# Patient Record
Sex: MALE | Race: WHITE | NOT HISPANIC OR LATINO | Employment: UNEMPLOYED | URBAN - METROPOLITAN AREA
[De-identification: names, ages, dates, MRNs, and addresses within clinical notes are randomized per-mention and may not be internally consistent; named-entity substitution may affect disease eponyms.]

---

## 2023-12-22 ENCOUNTER — HOSPITAL ENCOUNTER (EMERGENCY)
Facility: HOSPITAL | Age: 43
Discharge: HOME | End: 2023-12-22
Attending: STUDENT IN AN ORGANIZED HEALTH CARE EDUCATION/TRAINING PROGRAM

## 2023-12-22 ENCOUNTER — HOSPITAL ENCOUNTER (EMERGENCY)
Facility: HOSPITAL | Age: 43
Discharge: HOME | End: 2023-12-23
Attending: STUDENT IN AN ORGANIZED HEALTH CARE EDUCATION/TRAINING PROGRAM

## 2023-12-22 ENCOUNTER — HOSPITAL ENCOUNTER (EMERGENCY)
Facility: HOSPITAL | Age: 43
Discharge: STILL ADMITTED OR ADMINISTRATIVE | End: 2024-02-08

## 2023-12-22 ENCOUNTER — APPOINTMENT (OUTPATIENT)
Dept: CARDIOLOGY | Facility: HOSPITAL | Age: 43
End: 2023-12-22

## 2023-12-22 VITALS
HEART RATE: 88 BPM | TEMPERATURE: 98.3 F | HEIGHT: 72 IN | RESPIRATION RATE: 17 BRPM | BODY MASS INDEX: 24.38 KG/M2 | WEIGHT: 180 LBS | DIASTOLIC BLOOD PRESSURE: 79 MMHG | SYSTOLIC BLOOD PRESSURE: 132 MMHG | OXYGEN SATURATION: 98 %

## 2023-12-22 DIAGNOSIS — F10.10 ALCOHOL ABUSE: Primary | ICD-10-CM

## 2023-12-22 DIAGNOSIS — F10.920 ALCOHOLIC INTOXICATION WITHOUT COMPLICATION (CMS-HCC): ICD-10-CM

## 2023-12-22 DIAGNOSIS — F10.920 ALCOHOLIC INTOXICATION WITHOUT COMPLICATION (CMS-HCC): Primary | ICD-10-CM

## 2023-12-22 DIAGNOSIS — Z20.2 STD EXPOSURE: ICD-10-CM

## 2023-12-22 DIAGNOSIS — Z59.819 HOUSING INSTABILITY: ICD-10-CM

## 2023-12-22 LAB
ALBUMIN SERPL-MCNC: 4.3 G/DL (ref 3.5–5)
ALP BLD-CCNC: 76 U/L (ref 35–125)
ALT SERPL-CCNC: 60 U/L (ref 5–40)
AMPHETAMINES UR QL SCN>1000 NG/ML: NEGATIVE
ANION GAP SERPL CALC-SCNC: 16 MMOL/L
APAP SERPL-MCNC: <5 UG/ML
AST SERPL-CCNC: 158 U/L (ref 5–40)
ATRIAL RATE: 92 BPM
BARBITURATES UR QL SCN>300 NG/ML: NEGATIVE
BASOPHILS # BLD AUTO: 0.03 X10*3/UL (ref 0–0.1)
BASOPHILS NFR BLD AUTO: 0.7 %
BENZODIAZ UR QL SCN>300 NG/ML: NEGATIVE
BILIRUB SERPL-MCNC: 1.3 MG/DL (ref 0.1–1.2)
BUN SERPL-MCNC: 7 MG/DL (ref 8–25)
BZE UR QL SCN>300 NG/ML: NEGATIVE
CALCIUM SERPL-MCNC: 9.2 MG/DL (ref 8.5–10.4)
CANNABINOIDS UR QL SCN>50 NG/ML: NEGATIVE
CHLORIDE SERPL-SCNC: 100 MMOL/L (ref 97–107)
CO2 SERPL-SCNC: 25 MMOL/L (ref 24–31)
CREAT SERPL-MCNC: 0.6 MG/DL (ref 0.4–1.6)
EOSINOPHIL # BLD AUTO: 0.05 X10*3/UL (ref 0–0.7)
EOSINOPHIL NFR BLD AUTO: 1.1 %
ERYTHROCYTE [DISTWIDTH] IN BLOOD BY AUTOMATED COUNT: 12.8 % (ref 11.5–14.5)
ETHANOL SERPL-MCNC: 0.37 G/DL
FENTANYL+NORFENTANYL UR QL SCN: NEGATIVE
FLUAV RNA RESP QL NAA+PROBE: NOT DETECTED
FLUBV RNA RESP QL NAA+PROBE: NOT DETECTED
GFR SERPL CREATININE-BSD FRML MDRD: >90 ML/MIN/1.73M*2
GLUCOSE SERPL-MCNC: 114 MG/DL (ref 65–99)
HCT VFR BLD AUTO: 45.8 % (ref 41–52)
HGB BLD-MCNC: 15.8 G/DL (ref 13.5–17.5)
IMM GRANULOCYTES # BLD AUTO: 0.01 X10*3/UL (ref 0–0.7)
IMM GRANULOCYTES NFR BLD AUTO: 0.2 % (ref 0–0.9)
LYMPHOCYTES # BLD AUTO: 1.08 X10*3/UL (ref 1.2–4.8)
LYMPHOCYTES NFR BLD AUTO: 24.8 %
MCH RBC QN AUTO: 34.3 PG (ref 26–34)
MCHC RBC AUTO-ENTMCNC: 34.5 G/DL (ref 32–36)
MCV RBC AUTO: 100 FL (ref 80–100)
METHADONE UR QL SCN>300 NG/ML: NEGATIVE
MONOCYTES # BLD AUTO: 0.7 X10*3/UL (ref 0.1–1)
MONOCYTES NFR BLD AUTO: 16.1 %
NEUTROPHILS # BLD AUTO: 2.49 X10*3/UL (ref 1.2–7.7)
NEUTROPHILS NFR BLD AUTO: 57.1 %
NRBC BLD-RTO: 0 /100 WBCS (ref 0–0)
OPIATES UR QL SCN>300 NG/ML: NEGATIVE
OXYCODONE UR QL: NEGATIVE
P AXIS: 119 DEGREES
P OFFSET: 193 MS
P ONSET: 138 MS
PCP UR QL SCN>25 NG/ML: NEGATIVE
PLATELET # BLD AUTO: 150 X10*3/UL (ref 150–450)
POTASSIUM SERPL-SCNC: 3.7 MMOL/L (ref 3.4–5.1)
PR INTERVAL: 162 MS
PROT SERPL-MCNC: 8.2 G/DL (ref 5.9–7.9)
Q ONSET: 219 MS
QRS COUNT: 16 BEATS
QRS DURATION: 100 MS
QT INTERVAL: 380 MS
QTC CALCULATION(BAZETT): 469 MS
QTC FREDERICIA: 438 MS
R AXIS: 99 DEGREES
RBC # BLD AUTO: 4.6 X10*6/UL (ref 4.5–5.9)
SALICYLATES SERPL-MCNC: <0 MG/DL
SARS-COV-2 RNA RESP QL NAA+PROBE: NOT DETECTED
SODIUM SERPL-SCNC: 141 MMOL/L (ref 133–145)
T AXIS: 116 DEGREES
T OFFSET: 409 MS
VENTRICULAR RATE: 92 BPM
WBC # BLD AUTO: 4.4 X10*3/UL (ref 4.4–11.3)

## 2023-12-22 PROCEDURE — 87636 SARSCOV2 & INF A&B AMP PRB: CPT | Performed by: STUDENT IN AN ORGANIZED HEALTH CARE EDUCATION/TRAINING PROGRAM

## 2023-12-22 PROCEDURE — 87800 DETECT AGNT MULT DNA DIREC: CPT | Mod: WESLAB | Performed by: PHYSICIAN ASSISTANT

## 2023-12-22 PROCEDURE — 80179 DRUG ASSAY SALICYLATE: CPT | Performed by: STUDENT IN AN ORGANIZED HEALTH CARE EDUCATION/TRAINING PROGRAM

## 2023-12-22 PROCEDURE — 93005 ELECTROCARDIOGRAM TRACING: CPT

## 2023-12-22 PROCEDURE — 2500000004 HC RX 250 GENERAL PHARMACY W/ HCPCS (ALT 636 FOR OP/ED): Performed by: PHYSICIAN ASSISTANT

## 2023-12-22 PROCEDURE — 85025 COMPLETE CBC W/AUTO DIFF WBC: CPT | Performed by: STUDENT IN AN ORGANIZED HEALTH CARE EDUCATION/TRAINING PROGRAM

## 2023-12-22 PROCEDURE — 2500000001 HC RX 250 WO HCPCS SELF ADMINISTERED DRUGS (ALT 637 FOR MEDICARE OP): Performed by: PHYSICIAN ASSISTANT

## 2023-12-22 PROCEDURE — 4500999001 HC ED NO CHARGE

## 2023-12-22 PROCEDURE — 99284 EMERGENCY DEPT VISIT MOD MDM: CPT | Performed by: STUDENT IN AN ORGANIZED HEALTH CARE EDUCATION/TRAINING PROGRAM

## 2023-12-22 PROCEDURE — 99284 EMERGENCY DEPT VISIT MOD MDM: CPT | Mod: 25

## 2023-12-22 PROCEDURE — 80053 COMPREHEN METABOLIC PANEL: CPT | Performed by: STUDENT IN AN ORGANIZED HEALTH CARE EDUCATION/TRAINING PROGRAM

## 2023-12-22 PROCEDURE — 80307 DRUG TEST PRSMV CHEM ANLYZR: CPT | Performed by: STUDENT IN AN ORGANIZED HEALTH CARE EDUCATION/TRAINING PROGRAM

## 2023-12-22 PROCEDURE — 99281 EMR DPT VST MAYX REQ PHY/QHP: CPT

## 2023-12-22 PROCEDURE — 99283 EMERGENCY DEPT VISIT LOW MDM: CPT | Performed by: STUDENT IN AN ORGANIZED HEALTH CARE EDUCATION/TRAINING PROGRAM

## 2023-12-22 PROCEDURE — 96372 THER/PROPH/DIAG INJ SC/IM: CPT

## 2023-12-22 PROCEDURE — 36415 COLL VENOUS BLD VENIPUNCTURE: CPT | Performed by: STUDENT IN AN ORGANIZED HEALTH CARE EDUCATION/TRAINING PROGRAM

## 2023-12-22 RX ORDER — CEFTRIAXONE 500 MG/1
500 INJECTION, POWDER, FOR SOLUTION INTRAMUSCULAR; INTRAVENOUS ONCE
Status: COMPLETED | OUTPATIENT
Start: 2023-12-22 | End: 2023-12-22

## 2023-12-22 RX ORDER — LANOLIN ALCOHOL/MO/W.PET/CERES
100 CREAM (GRAM) TOPICAL DAILY
Status: DISCONTINUED | OUTPATIENT
Start: 2023-12-22 | End: 2023-12-22 | Stop reason: HOSPADM

## 2023-12-22 RX ORDER — LORAZEPAM 0.5 MG/1
0.5 TABLET ORAL EVERY 2 HOUR PRN
Status: DISCONTINUED | OUTPATIENT
Start: 2023-12-22 | End: 2023-12-22 | Stop reason: HOSPADM

## 2023-12-22 RX ORDER — FOLIC ACID 1 MG/1
1 TABLET ORAL DAILY
Status: DISCONTINUED | OUTPATIENT
Start: 2023-12-22 | End: 2023-12-22 | Stop reason: HOSPADM

## 2023-12-22 RX ORDER — MULTIVIT-MIN/IRON FUM/FOLIC AC 7.5 MG-4
1 TABLET ORAL DAILY
Status: DISCONTINUED | OUTPATIENT
Start: 2023-12-22 | End: 2023-12-22 | Stop reason: HOSPADM

## 2023-12-22 RX ORDER — LORAZEPAM 1 MG/1
2 TABLET ORAL EVERY 2 HOUR PRN
Status: DISCONTINUED | OUTPATIENT
Start: 2023-12-22 | End: 2023-12-22 | Stop reason: HOSPADM

## 2023-12-22 RX ORDER — LORAZEPAM 1 MG/1
1 TABLET ORAL EVERY 2 HOUR PRN
Status: DISCONTINUED | OUTPATIENT
Start: 2023-12-22 | End: 2023-12-22 | Stop reason: HOSPADM

## 2023-12-22 RX ORDER — AZITHROMYCIN 500 MG/1
1000 TABLET, FILM COATED ORAL ONCE
Status: COMPLETED | OUTPATIENT
Start: 2023-12-22 | End: 2023-12-22

## 2023-12-22 RX ADMIN — FOLIC ACID 1 MG: 1 TABLET ORAL at 12:20

## 2023-12-22 RX ADMIN — Medication 1 TABLET: at 12:20

## 2023-12-22 RX ADMIN — Medication 100 MG: at 12:20

## 2023-12-22 RX ADMIN — AZITHROMYCIN DIHYDRATE 1000 MG: 500 TABLET ORAL at 12:50

## 2023-12-22 RX ADMIN — CEFTRIAXONE SODIUM 500 MG: 500 INJECTION, POWDER, FOR SOLUTION INTRAMUSCULAR; INTRAVENOUS at 12:50

## 2023-12-22 SDOH — HEALTH STABILITY: MENTAL HEALTH: IN THE PAST FEW WEEKS, HAVE YOU FELT THAT YOU OR YOUR FAMILY WOULD BE BETTER OFF IF YOU WERE DEAD?: NO

## 2023-12-22 SDOH — HEALTH STABILITY: MENTAL HEALTH: SUICIDAL BEHAVIOR (LIFETIME): NO

## 2023-12-22 SDOH — HEALTH STABILITY: MENTAL HEALTH: HAVE YOU ACTUALLY HAD ANY THOUGHTS OF KILLING YOURSELF?: NO

## 2023-12-22 SDOH — HEALTH STABILITY: MENTAL HEALTH: SUICIDE ASSESSMENT: ADULT (C-SSRS)

## 2023-12-22 SDOH — HEALTH STABILITY: MENTAL HEALTH: IN THE PAST FEW WEEKS, HAVE YOU WISHED YOU WERE DEAD?: NO

## 2023-12-22 SDOH — HEALTH STABILITY: MENTAL HEALTH: HAVE YOU EVER TRIED TO KILL YOURSELF?: NO

## 2023-12-22 SDOH — HEALTH STABILITY: MENTAL HEALTH: ARE YOU HAVING THOUGHTS OF KILLING YOURSELF RIGHT NOW?: NO

## 2023-12-22 SDOH — HEALTH STABILITY: MENTAL HEALTH: HAVE YOU WISHED YOU WERE DEAD OR WISHED YOU COULD GO TO SLEEP AND NOT WAKE UP?: NO

## 2023-12-22 SDOH — HEALTH STABILITY: MENTAL HEALTH: IN THE PAST WEEK, HAVE YOU BEEN HAVING THOUGHTS ABOUT KILLING YOURSELF?: NO

## 2023-12-22 SDOH — HEALTH STABILITY: MENTAL HEALTH: ANXIETY SYMPTOMS: NO PROBLEMS REPORTED OR OBSERVED.

## 2023-12-22 SDOH — HEALTH STABILITY: MENTAL HEALTH: DEPRESSION SYMPTOMS: NO PROBLEMS REPORTED OR OBSERVED.

## 2023-12-22 SDOH — ECONOMIC STABILITY - HOUSING INSECURITY: HOUSING INSTABILITY UNSPECIFIED: Z59.819

## 2023-12-22 SDOH — HEALTH STABILITY: MENTAL HEALTH: NON-SPECIFIC ACTIVE SUICIDAL THOUGHTS (PAST 1 MONTH): NO

## 2023-12-22 SDOH — HEALTH STABILITY: MENTAL HEALTH: WISH TO BE DEAD (PAST 1 MONTH): NO

## 2023-12-22 SDOH — ECONOMIC STABILITY: HOUSING INSECURITY: FEELS SAFE LIVING IN HOME: YES

## 2023-12-22 SDOH — HEALTH STABILITY: MENTAL HEALTH: HAVE YOU EVER DONE ANYTHING, STARTED TO DO ANYTHING, OR PREPARED TO DO ANYTHING TO END YOUR LIFE?: NO

## 2023-12-22 ASSESSMENT — LIFESTYLE VARIABLES
TOTAL SCORE: 1
HAVE YOU EVER FELT YOU SHOULD CUT DOWN ON YOUR DRINKING: NO
AUDITORY DISTURBANCES: NOT PRESENT
PAROXYSMAL SWEATS: NO SWEAT VISIBLE
ORIENTATION AND CLOUDING OF SENSORIUM: ORIENTED AND CAN DO SERIAL ADDITIONS
HEADACHE, FULLNESS IN HEAD: NOT PRESENT
HEADACHE, FULLNESS IN HEAD: NOT PRESENT
NAUSEA AND VOMITING: NO NAUSEA AND NO VOMITING
EVER HAD A DRINK FIRST THING IN THE MORNING TO STEADY YOUR NERVES TO GET RID OF A HANGOVER: NO
ANXIETY: MILDLY ANXIOUS
HAVE PEOPLE ANNOYED YOU BY CRITICIZING YOUR DRINKING: NO
EVER FELT BAD OR GUILTY ABOUT YOUR DRINKING: NO
AGITATION: NORMAL ACTIVITY
VISUAL DISTURBANCES: NOT PRESENT
ORIENTATION AND CLOUDING OF SENSORIUM: ORIENTED AND CAN DO SERIAL ADDITIONS
PAROXYSMAL SWEATS: NO SWEAT VISIBLE
TOTAL SCORE: 0
VISUAL DISTURBANCES: NOT PRESENT
HAVE PEOPLE ANNOYED YOU BY CRITICIZING YOUR DRINKING: NO
SUBSTANCE_ABUSE_PAST_12_MONTHS: NO
TREMOR: NO TREMOR
ANXIETY: NO ANXIETY, AT EASE
AUDITORY DISTURBANCES: NOT PRESENT
REASON UNABLE TO ASSESS: NO
EVER HAD A DRINK FIRST THING IN THE MORNING TO STEADY YOUR NERVES TO GET RID OF A HANGOVER: NO
NAUSEA AND VOMITING: NO NAUSEA AND NO VOMITING
HAVE YOU EVER FELT YOU SHOULD CUT DOWN ON YOUR DRINKING: NO
HEADACHE, FULLNESS IN HEAD: NOT PRESENT
VISUAL DISTURBANCES: NOT PRESENT
PRESCIPTION_ABUSE_PAST_12_MONTHS: NO
TREMOR: NO TREMOR
ORIENTATION AND CLOUDING OF SENSORIUM: ORIENTED AND CAN DO SERIAL ADDITIONS
AGITATION: NORMAL ACTIVITY
ANXIETY: NO ANXIETY, AT EASE
AUDITORY DISTURBANCES: NOT PRESENT
PAROXYSMAL SWEATS: NO SWEAT VISIBLE
AGITATION: NORMAL ACTIVITY
EVER FELT BAD OR GUILTY ABOUT YOUR DRINKING: NO
TREMOR: NO TREMOR
NAUSEA AND VOMITING: NO NAUSEA AND NO VOMITING
TOTAL SCORE: 0
REASON UNABLE TO ASSESS: NO

## 2023-12-22 ASSESSMENT — ABNORMAL INVOLUNTARY MOVEMENT SCALE (AIMS)
NECK_SHOULDER_HIPS: NONE, NORMAL
CURRENT_PROBLEMS_TEETH_DENTURES: NO
LIPS_PARIETAL: NONE, NORMAL
AIMS_PATIENT_AWARENESS: NO AWARENESS
UPPER_BODY_EXTREMITIES: NONE, NORMAL
PATIENT_WEARS_DENTURES: NO
LOWER_BODY_EXTREMITIES: NONE, NORMAL
FACIAL_EXPRESSION_MUSCLES: NONE, NORMAL
AIMS_PATIENT_INCAPACITATION: NONE, NORMAL
TONGUE: NONE, NORMAL
JAW: NONE, NORMAL

## 2023-12-22 ASSESSMENT — COLUMBIA-SUICIDE SEVERITY RATING SCALE - C-SSRS
2. HAVE YOU ACTUALLY HAD ANY THOUGHTS OF KILLING YOURSELF?: NO
1. IN THE PAST MONTH, HAVE YOU WISHED YOU WERE DEAD OR WISHED YOU COULD GO TO SLEEP AND NOT WAKE UP?: NO
6. HAVE YOU EVER DONE ANYTHING, STARTED TO DO ANYTHING, OR PREPARED TO DO ANYTHING TO END YOUR LIFE?: NO

## 2023-12-22 ASSESSMENT — PAIN SCALES - GENERAL
PAINLEVEL_OUTOF10: 0 - NO PAIN
PAINLEVEL_OUTOF10: 0 - NO PAIN

## 2023-12-22 ASSESSMENT — PAIN DESCRIPTION - PROGRESSION: CLINICAL_PROGRESSION: NOT CHANGED

## 2023-12-22 ASSESSMENT — PAIN - FUNCTIONAL ASSESSMENT
PAIN_FUNCTIONAL_ASSESSMENT: 0-10
PAIN_FUNCTIONAL_ASSESSMENT: 0-10

## 2023-12-22 NOTE — PROGRESS NOTES
"EPAT - Social Work Psychiatric Assessment    Arrival Details  Mode of Arrival: Ambulatory  Admission Source: Home  Admission Type: Voluntary  EPAT Assessment Start Date: 12/22/23  EPAT Assessment Start Time: 1240  Name of : Sofy Medina LPC    History of Present Illness  Admission Reason: alcohol detox  HPI: Pt is a 44y/o male presenting to [facility] for alcohol detox. Pt was sent from [facility] for medical clearance and referral to [facility]. Pt chart, triage and provider notes reviewed prior to consult. Nursing notes report pt as \"no risk indicated\". Pt BAL .398 on arrival. Pt reports drinking an unknown amount of vodka nearly every day. Pt unable to provide direct timelines of use history but shares he first started drinking in high school. Pt reports that he is new to [facility] and has moved in w/ sober supports. Pt denies any previous attempts at detoxing or treatment but is active w/ AA and has a sponsor. Pt denies SI/HI/AH/VH. Pt denies current withdrawal symptoms.    SW Readmission Information   Readmission within 30 Days: No    Psychiatric Symptoms  Anxiety Symptoms: No problems reported or observed.  Depression Symptoms: No problems reported or observed.  Janina Symptoms: No problems reported or observed.    Psychosis Symptoms  Hallucination Type: No problems reported or observed.  Delusion Type: No problems reported or observed.    Additional Symptoms - Adult  Generalized Anxiety Disorder: No problems reported or observed.  Obsessive Compulsive Disorder: No problems reported or observed.  Panic Attack: No problems reported or observed.  Post Traumatic Stress Disorder: No problems reported or observed.  Delirium: No problems reported or observed.    Past Psychiatric History/Meds/Treatments  Past Psychiatric History: alcohol abuse, denies other MH history  Past Psychiatric Meds/Treatments: Denies  Past Violence/Victimization History: Denies    Current Mental Health Contacts   Name/Phone Number: " "Denies    Support System: Immediate family, Extended family, Other (Comment) (AA sponsor)    Living Arrangement: Lives with someone (Pt reports that he lives w/ a friend.)    Home Safety  Feels Safe Living in Home: Yes    Income Information  Employment Status for: Patient  Employment Status: Employed              Legal  Legal Considerations: Patient/ Family Ability to Make Healthcare Decisions  Legal Concerns: Pt reports he has a \"situation\" he's working through but denies being on probation.    Drug Screening  Have you used any substances (canabis, cocaine, heroin, hallucinogens, inhalants, etc.) in the past 12 months?: No  Have you used any prescription drugs other than prescribed in the past 12 months?: No  Is a toxicology screen needed?: Yes    Stage of Change  Stage of Change: Action  History of Treatment:  (Denies)  Type of Treatment Offered: Inpatient  Treatment Offered: Accepted  Frequency of Substance Use: nearly every day  Age of First Substance Use: high school    Psychosocial  Psychosocial (WDL): Within Defined Limits    Orientation  Orientation Level: Oriented X4, Appropriate for developmental age    General Appearance  Motor Activity: Unremarkable  General Attitude: Cooperative, Pleasant  Appearance/Hygiene: Unremarkable    Thought Process  Content: Unremarkable  Perception: Not altered  Hallucination: None  Judgment/Insight: Poor  Confusion: None  Cognition: Appropriate safety awareness, Appropriate attention/concentration, Appropriate for developmental age         Risk Factors  Self Harm/Suicidal Ideation Plan: Denies current or previous SI.  Previous Self Harm/Suicidal Plans: Denies  Risk Factors: Substance abuse, Male  Description of Thoughts/Ideas Leaving Unit Now: Denies    Violence Risk Assessment  Assessment of Violence: None noted  Thoughts of Harm to Others: No    Ability to Assess Risk Screen  Risk Screen - Ability to Assess: Unable to assess  Ask Suicide-Screening Questions  1. In the past " few weeks, have you wished you were dead?: No  2. In the past few weeks, have you felt that you or your family would be better off if you were dead?: No  3. In the past week, have you been having thoughts about killing yourself?: No  4. Have you ever tried to kill yourself?: No  5. Are you having thoughts of killing yourself right now?: No  Calculated Risk Score: No intervention is necessary  [place] Suicide Severity Rating Scale (Screener/Recent Self-Report)  1. Wish to be Dead (Past 1 Month): No  2. Non-Specific Active Suicidal Thoughts (Past 1 Month): No  6. Suicidal Behavior (Lifetime): No  Calculated C-SSRS Risk Score (Lifetime/Recent): No Risk Indicated  Step 5: Documentation  Risk Level: Low suicide risk    Psychiatric Impression and Plan of Care  Assessment and Plan: SW met FTF w/ pt for ss consult. Pt calm and cooperative and participates appropriately. Pt BAL .398 but is otherwise medically clear per attending. Pt will be referred to [facility].  Specific Resources Provided to Patient: Peer supportChuck, met w/ pt for additional support.  CM Notified: N/A    Outcome/Disposition  Patient's Perception of Outcome Achieved: in agreement  Assessment, Recommendations and Risk Level Reviewed with: Dr Gamal KHALIL Assessment Completed Date: 12/22/23  EPAT Assessment Completed Time: 1253  Patient Disposition: Out of network facility (Specify) ([facility])  Out of Network Reason: Other (Comment) ([facility]) Pt declined for [facility] due to not being [facility] for at least one year.

## 2023-12-22 NOTE — ED PROVIDER NOTES
HPI   Chief Complaint   Patient presents with   • Alcohol Problem   • Alcohol Detox       43-year-old male presented emergency department the chief complaint of alcohol use disorder.  He drinks alcohol daily.  Typically vodka.  He would like to stop drinking.  His last drink was just prior to arrival.  He is not suicidal or homicidal.  He denies lightheadedness dizziness chest pain shortness of breath abdominal pain dysuria.  He is complaining of penile discharge and requesting STD screening and testing.  Denies injury or trauma.  No other complaint.                          Mary Coma Scale Score: 15                  Patient History   Past Medical History:   Diagnosis Date   • Mental health problem     Denies to state what kind     History reviewed. No pertinent surgical history.  No family history on file.  Social History     Tobacco Use   • Smoking status: Never   • Smokeless tobacco: Never   Substance Use Topics   • Alcohol use: Not on file   • Drug use: Not on file       Physical Exam   ED Triage Vitals [12/22/23 1118]   Temp Heart Rate Resp BP   37.1 °C (98.8 °F) 100 18 (!) 129/103      SpO2 Temp Source Heart Rate Source Patient Position   97 % Temporal Monitor Sitting      BP Location FiO2 (%)     Right arm --       Physical Exam  Vitals and nursing note reviewed.   Constitutional:       Appearance: Normal appearance.   HENT:      Head: Normocephalic and atraumatic.      Nose: Nose normal.      Mouth/Throat:      Mouth: Mucous membranes are moist.      Pharynx: No oropharyngeal exudate.   Cardiovascular:      Rate and Rhythm: Normal rate and regular rhythm.   Pulmonary:      Effort: Pulmonary effort is normal.      Breath sounds: Normal breath sounds.   Abdominal:      General: Abdomen is flat.      Palpations: Abdomen is soft.   Musculoskeletal:         General: Normal range of motion.      Cervical back: Normal range of motion.   Skin:     General: Skin is warm and dry.   Neurological:      General: No  focal deficit present.      Mental Status: He is alert and oriented to person, place, and time.   Psychiatric:         Mood and Affect: Mood normal.         ED Course & MDM   ED Course as of 12/22/23 1712   Fri Dec 22, 2023   1422 EKG presented to me at 2:22 PM     EKG as interpreted by me shows normal sinus rhythm at a rate of 92 bpm, no ST changes to suggest STEMI, normal axis, normal intervals.   [DH]      ED Course User Index  [DH] Asim Yeung MD         Diagnoses as of 12/22/23 1712   Alcohol abuse   Alcoholic intoxication without complication (CMS/HCC)   STD exposure       Medical Decision Making  I have seen and evaluated this patient.  The attending physician has also seen and evaluated this patient.  Vital signs, laboratory testing and diagnostic images if applicable have been reviewed.  All laboratory and imaging is interpreted by myself unless otherwise stated.  Radiology studies are also formally interpreted by radiologist.    CBC without significant leukocytosis or anemia, metabolic panel without significant renal impairment or electrolyte abnormality.  Patient with elevated alcohol level.  He is medically cleared.  He does not like [facility] resident and therefore the [facility] will not pay for his alcohol detox.  He is agreeable to discharge.  Patient will obtain sober ride and be discharged from an emergent standpoint.  Return if worsening or changes symptoms.  No evidence of withdrawal at this point in time.    Labs Reviewed  CBC WITH AUTO DIFFERENTIAL - Abnormal     WBC                           4.4                    nRBC                          0.0                    RBC                           4.60                   Hemoglobin                    15.8                   Hematocrit                    45.8                   MCV                           100                    MCH                           34.3 (*)               MCHC                          34.5                   RDW                            12.8                   Platelets                     150                    Neutrophils %                 57.1                   Immature Granulocytes %, Automated   0.2                    Lymphocytes %                 24.8                   Monocytes %                   16.1                   Eosinophils %                 1.1                    Basophils %                   0.7                    Neutrophils Absolute          2.49                   Immature Granulocytes Absolute, Au*   0.01                   Lymphocytes Absolute          1.08 (*)               Monocytes Absolute            0.70                   Eosinophils Absolute          0.05                   Basophils Absolute            0.03                COMPREHENSIVE METABOLIC PANEL - Abnormal     Glucose                       114 (*)                Sodium                        141                    Potassium                     3.7                    Chloride                      100                    Bicarbonate                   25                     Urea Nitrogen                 7 (*)                  Creatinine                    0.60                   eGFR                          >90                    Calcium                       9.2                    Albumin                       4.3                    Alkaline Phosphatase          76                     Total Protein                 8.2 (*)                AST                           158 (*)                Bilirubin, Total              1.3 (*)                ALT                           60 (*)                 Anion Gap                     16                  ACUTE TOXICOLOGY PANEL, BLOOD - Abnormal     Acetaminophen                 <5.0                   Salicylate                    <0                     Alcohol                       0.368 (*)            DRUG SCREEN,URINE - Normal     Amphetamine Screen, Urine     Negative                Barbiturate Screen, Urine     Negative                 Benzodiazepines Screen, Urine   Negative                Cannabinoid Screen, Urine     Negative                Cocaine Metabolite Screen, Urine   Negative                Fentanyl Screen, Urine        Negative                Methadone Screen, Urine       Negative                Opiate Screen, Urine          Negative                Oxycodone Screen, Urine       Negative                PCP Screen, Urine             Negative                    Narrative: These toxicological screening tests provide unconfirmed qualitative measurements to aid in treatment and diagnosis in cases of drug use or overdose. This test is used only for medical purposes. A positive result does not indicate or measure intoxication. For specific test performance or pathologist consultation, please contact the Laboratory.                                    The following threshold concentrations are used for these analyses.Values at or above the threshold concentration are reported as positive. Values below the threshold are reported as negative.                                    Drug /Screening Threshold                                                                                                                                                 THC/CANNABINOIDS................50 ng/ml                  METHADONE......................300 ng/ml                  COCAINE METABOLITES............300 ng/ml                  BENZODIAZEPINE.................300 ng/ml                  PCP.............................25 ng/ml                  OPIATE.........................300 ng/ml                  AMPHETAMINE/ECSTASY...........1000 ng/ml                  BARBITURATE....................200 ng/ml                  OXYCODONE......................100 ng/ml                  FENTANYL.........................5 ng/ml                                      SARS-COV-2 AND INFLUENZA A/B PCR - Normal     Flu A Result                                         Flu B  Result                                         Coronavirus 2019, PCR                                    Narrative: This assay has received FDA Emergency Use Authorization (EUA) and  is only authorized for the duration of time that circumstances exist to justify the authorization of the emergency use of in vitro diagnostic tests for the detection of SARS-CoV-2 virus and/or diagnosis of COVID-19 infection under section 564(b)(1) of the Act, 21 U.S.C. 360bbb-3(b)(1). Testing for SARS-CoV-2 is only recommended for patients who meet current clinical and/or epidemiological criteria as defined by federal, state, or local public health directives. This assay is an in vitro diagnostic nucleic acid amplification test for the qualitative detection of SARS-CoV-2, Influenza A, and Influenza B from nasopharyngeal specimens and has been validated for use at [facility]. Negative results do not preclude COVID-19 infections or Influenza A/B infections, and should not be used as the sole basis for diagnosis, treatment, or other management decisions. If Influenza A/B and RSV PCR results are negative, testing for Parainfluenza virus, Adenovirus and Metapneumovirus is routinely performed for [facility] pediatric oncology and intensive care inpatients, and is available on other patients by placing an add-on request.   C. TRACHOMATIS + N. GONORRHOEAE, AMPLIFIED  No orders to display           Your medication list      You have not been prescribed any medications.           Procedure  Procedures     Hua Desouza PA-C  12/22/23 4779

## 2023-12-22 NOTE — PROGRESS NOTES
Social Work Note    1633 received text from Katherine Timmons @ [facility] declining pt from [facility] due to not being a [facility] resident for at least one year.   [id number] FTF w/ pt and peer support, Chuck SHEEHAN, for dispo planning. Pt provided resources for other inpatient detox and encouraged to follow up w/ [facility] outpatient.  Pt shares he is uncertain that he has anywhere to go this evening due to a conflict w/ the person he was staying with. Pt intends to contact his sponsor w/ peer support present to further discuss options.

## 2023-12-22 NOTE — ED NOTES
Assuming care of this pt. Pt states he is here for alcohol detox. Drinks 4-6 glasses of vodka a day. States he has pain in his head (mental pain) denies any headaches or any other pain. Took 2 shots this morning.      Zaina Sánchez RN  12/22/23 1764

## 2023-12-22 NOTE — ED TRIAGE NOTES
Pt states he went to [facility] for detox, but sent here for med clearance. Last drink this morning, he had 1 vodka soda. Normally drinks vodka

## 2023-12-22 NOTE — PROGRESS NOTES
"0654PM  Called to bedside by RN and ANASTASIYA. Went to eval the patient. He is ambulatory and clinically sober. He states that he lost his place to stay. His friend kicked him out. He was offered homeless resources earlier in the night by ANASTASIYA but opted to stay in the ED for the time being and [facility] shelter is now closed. He is explicitly asking for housing. When SW and I told him that there we no options for housing at this time, the patient said, \"well then I guess I wanna hurt myself.\" I asked him to elaborate on this and he could not, immediately started talking about housing issues again. Cannot elaborate on plan. Has no access to weapons. After further discussion about housing, patient spoke with future-planning and said that, in fact, his friend likely would take him back and he would feel safe leaving if he could get a ride to his friend's house. Will attempt to arrange a ride. At this time, his vague and very passive statement of SI is suspected to be related to secondary gain. Regardless, has no plan so will attempt to find ride and DC as he does not meet criteria for a psychiatric hold.   "

## 2023-12-23 VITALS
RESPIRATION RATE: 17 BRPM | BODY MASS INDEX: 24.38 KG/M2 | OXYGEN SATURATION: 98 % | HEIGHT: 72 IN | HEART RATE: 80 BPM | WEIGHT: 180 LBS | TEMPERATURE: 97.9 F | DIASTOLIC BLOOD PRESSURE: 70 MMHG | SYSTOLIC BLOOD PRESSURE: 131 MMHG

## 2023-12-23 LAB
C TRACH RRNA SPEC QL NAA+PROBE: NEGATIVE
N GONORRHOEA DNA SPEC QL PROBE+SIG AMP: NEGATIVE

## 2023-12-23 RX ORDER — CYCLOBENZAPRINE HCL 5 MG
5 TABLET ORAL 3 TIMES DAILY PRN
COMMUNITY

## 2023-12-23 RX ORDER — ESCITALOPRAM OXALATE 10 MG/1
10 TABLET ORAL DAILY
COMMUNITY

## 2023-12-23 ASSESSMENT — COLUMBIA-SUICIDE SEVERITY RATING SCALE - C-SSRS
1. IN THE PAST MONTH, HAVE YOU WISHED YOU WERE DEAD OR WISHED YOU COULD GO TO SLEEP AND NOT WAKE UP?: NO
6. HAVE YOU EVER DONE ANYTHING, STARTED TO DO ANYTHING, OR PREPARED TO DO ANYTHING TO END YOUR LIFE?: NO
2. HAVE YOU ACTUALLY HAD ANY THOUGHTS OF KILLING YOURSELF?: NO

## 2023-12-23 ASSESSMENT — PAIN DESCRIPTION - PROGRESSION: CLINICAL_PROGRESSION: NOT CHANGED

## 2023-12-23 ASSESSMENT — PAIN SCALES - GENERAL
PAINLEVEL_OUTOF10: 0 - NO PAIN
PAINLEVEL_OUTOF10: 0 - NO PAIN

## 2023-12-23 NOTE — ED NOTES
Per MD and SW conversation with patient. Patient to be discharged and locate ride as patient clinically sober per MD. Patient agreeable to plan       Naldo Lomax LPN  12/23/23 7878

## 2023-12-23 NOTE — ED TRIAGE NOTES
Pt was discharged and removed from the board but then never left the room. Pt became suicidal after not being able to find a ride but was not reregistered until I took over care at 1900. Pt still in his clothing and still has all his personal belongings, room not cleared for safety at the time I assumed care of pt.

## 2023-12-23 NOTE — PROGRESS NOTES
0241  Went to reassess patient as he is now sober based on alcohol level obtained at noon yesterday. HE is ambulatory. He is sleeping comfortably. At this point, now sober, does not mention anything about SI. Continues to state that he has no housing and wants assistance finding a place to stay. Will DC in AM. HE has number of his AA partner and his girlfriend.

## 2023-12-23 NOTE — ED NOTES
Pt was already seen and cleared per provider and social work on his initial visit. Pt was given permission to stay here until he is sober, which based on calculations is around 8 am on 12/23. Pt is not on any suicide precautions and does not have a sitter as he has already been evaluated and cleared on his initial encounter. His belongings still remain in locker as he is resting in the hallway.     Jaz Olivera RN  12/22/23 3976